# Patient Record
Sex: MALE | Race: WHITE | Employment: UNEMPLOYED | URBAN - METROPOLITAN AREA
[De-identification: names, ages, dates, MRNs, and addresses within clinical notes are randomized per-mention and may not be internally consistent; named-entity substitution may affect disease eponyms.]

---

## 2019-12-06 ENCOUNTER — HOSPITAL ENCOUNTER (INPATIENT)
Facility: CLINIC | Age: 24
LOS: 4 days | Discharge: HOME | End: 2019-12-11
Attending: PSYCHIATRY & NEUROLOGY | Admitting: PSYCHIATRY & NEUROLOGY
Payer: MEDICAID

## 2019-12-06 DIAGNOSIS — R45.851 SUICIDAL IDEATION: ICD-10-CM

## 2019-12-06 DIAGNOSIS — F33.2 SEVERE RECURRENT MAJOR DEPRESSION WITHOUT PSYCHOTIC FEATURES (H): ICD-10-CM

## 2019-12-06 DIAGNOSIS — F41.9 ANXIETY: Primary | ICD-10-CM

## 2019-12-06 DIAGNOSIS — F39 MOOD DISORDER (H): ICD-10-CM

## 2019-12-06 LAB
AMPHETAMINES UR QL SCN: NEGATIVE
BARBITURATES UR QL: NEGATIVE
BENZODIAZ UR QL: NEGATIVE
CANNABINOIDS UR QL SCN: NEGATIVE
COCAINE UR QL: NEGATIVE
ETHANOL UR QL SCN: NEGATIVE
OPIATES UR QL SCN: NEGATIVE

## 2019-12-06 PROCEDURE — 80320 DRUG SCREEN QUANTALCOHOLS: CPT | Performed by: PSYCHIATRY & NEUROLOGY

## 2019-12-06 PROCEDURE — 90791 PSYCH DIAGNOSTIC EVALUATION: CPT

## 2019-12-06 PROCEDURE — 99285 EMERGENCY DEPT VISIT HI MDM: CPT | Mod: 25 | Performed by: PSYCHIATRY & NEUROLOGY

## 2019-12-06 PROCEDURE — 99284 EMERGENCY DEPT VISIT MOD MDM: CPT | Mod: Z6 | Performed by: PSYCHIATRY & NEUROLOGY

## 2019-12-06 PROCEDURE — 80307 DRUG TEST PRSMV CHEM ANLYZR: CPT | Performed by: PSYCHIATRY & NEUROLOGY

## 2019-12-06 ASSESSMENT — ENCOUNTER SYMPTOMS
NERVOUS/ANXIOUS: 0
ABDOMINAL PAIN: 0
DYSPHORIC MOOD: 1
HALLUCINATIONS: 0
SHORTNESS OF BREATH: 0
FEVER: 0

## 2019-12-06 NOTE — ED TRIAGE NOTES
Hx of PTSD and depression.  Staying with Brother in [place] (originally from [place]).  Cut self on right and left forearms with different stages of healing.  Called EMS due to feeling unsafe.  Does not have regular treatment or doc in [place], but has Hx of medications in [place]/ not currently on any meds.  Family in [place]

## 2019-12-06 NOTE — ED NOTES
Bed: ED16A  Expected date: 12/6/19  Expected time: 5:05 PM  Means of arrival:   Comments:  Dudley [id number]   23 y/o male SI

## 2019-12-07 PROBLEM — R45.851 SUICIDAL IDEATION: Status: ACTIVE | Noted: 2019-12-07

## 2019-12-07 PROCEDURE — 36415 COLL VENOUS BLD VENIPUNCTURE: CPT | Performed by: NURSE PRACTITIONER

## 2019-12-07 PROCEDURE — 12400001 ZZH R&B MH UMMC

## 2019-12-07 PROCEDURE — 82306 VITAMIN D 25 HYDROXY: CPT | Performed by: NURSE PRACTITIONER

## 2019-12-07 PROCEDURE — 99223 1ST HOSP IP/OBS HIGH 75: CPT | Mod: AI | Performed by: NURSE PRACTITIONER

## 2019-12-07 PROCEDURE — 25000132 ZZH RX MED GY IP 250 OP 250 PS 637: Performed by: NURSE PRACTITIONER

## 2019-12-07 RX ORDER — ALUMINA, MAGNESIA, AND SIMETHICONE 2400; 2400; 240 MG/30ML; MG/30ML; MG/30ML
30 SUSPENSION ORAL EVERY 4 HOURS PRN
Status: DISCONTINUED | OUTPATIENT
Start: 2019-12-07 | End: 2019-12-11 | Stop reason: HOSPADM

## 2019-12-07 RX ORDER — TRAZODONE HYDROCHLORIDE 50 MG/1
50 TABLET, FILM COATED ORAL
Status: DISCONTINUED | OUTPATIENT
Start: 2019-12-07 | End: 2019-12-11 | Stop reason: HOSPADM

## 2019-12-07 RX ORDER — OLANZAPINE 10 MG/1
10 TABLET ORAL
Status: DISCONTINUED | OUTPATIENT
Start: 2019-12-07 | End: 2019-12-07

## 2019-12-07 RX ORDER — SERTRALINE HYDROCHLORIDE 25 MG/1
25 TABLET, FILM COATED ORAL DAILY
Status: COMPLETED | OUTPATIENT
Start: 2019-12-07 | End: 2019-12-08

## 2019-12-07 RX ORDER — HYDROXYZINE HYDROCHLORIDE 25 MG/1
25 TABLET, FILM COATED ORAL EVERY 4 HOURS PRN
Status: DISCONTINUED | OUTPATIENT
Start: 2019-12-07 | End: 2019-12-07

## 2019-12-07 RX ORDER — BISACODYL 10 MG
10 SUPPOSITORY, RECTAL RECTAL DAILY PRN
Status: DISCONTINUED | OUTPATIENT
Start: 2019-12-07 | End: 2019-12-11 | Stop reason: HOSPADM

## 2019-12-07 RX ORDER — OLANZAPINE 10 MG/2ML
10 INJECTION, POWDER, FOR SOLUTION INTRAMUSCULAR
Status: DISCONTINUED | OUTPATIENT
Start: 2019-12-07 | End: 2019-12-07

## 2019-12-07 RX ORDER — ACETAMINOPHEN 325 MG/1
650 TABLET ORAL EVERY 4 HOURS PRN
Status: DISCONTINUED | OUTPATIENT
Start: 2019-12-07 | End: 2019-12-11 | Stop reason: HOSPADM

## 2019-12-07 RX ORDER — HYDROXYZINE HYDROCHLORIDE 25 MG/1
25-50 TABLET, FILM COATED ORAL EVERY 4 HOURS PRN
Status: DISCONTINUED | OUTPATIENT
Start: 2019-12-07 | End: 2019-12-11 | Stop reason: HOSPADM

## 2019-12-07 RX ADMIN — SERTRALINE HYDROCHLORIDE 25 MG: 25 TABLET ORAL at 11:18

## 2019-12-07 ASSESSMENT — ACTIVITIES OF DAILY LIVING (ADL)
HYGIENE/GROOMING: INDEPENDENT
RETIRED_EATING: 0-->INDEPENDENT
HYGIENE/GROOMING: INDEPENDENT
DRESS: INDEPENDENT
FALL_HISTORY_WITHIN_LAST_SIX_MONTHS: NO
TRANSFERRING: 0-->INDEPENDENT
TOILETING: 0-->INDEPENDENT
SWALLOWING: 0-->SWALLOWS FOODS/LIQUIDS WITHOUT DIFFICULTY
RETIRED_COMMUNICATION: 0-->UNDERSTANDS/COMMUNICATES WITHOUT DIFFICULTY
ORAL_HYGIENE: INDEPENDENT
BATHING: 0-->INDEPENDENT
COGNITION: 0 - NO COGNITION ISSUES REPORTED
DRESS: 0-->INDEPENDENT
AMBULATION: 0-->INDEPENDENT

## 2019-12-07 ASSESSMENT — MIFFLIN-ST. JEOR: SCORE: 1500.67

## 2019-12-07 NOTE — ED PROVIDER NOTES
History     Chief Complaint   Patient presents with     Suicidal     plan to cut wrists     The history is provided by the patient and medical records.     Augustin Estrella is a 24 year old male who comes in due to his worsening depression.  He moved to [place] to live with his brother in August 2019.  He has been unable to hold down a job longer than 2 weeks. His brother does not understand his mental health and says things that are hard for Augustin to hear. He has been thinking of hanging himself now for several weeks. He had a noose up in the garage with a stool under it but did not do it.  Today he thought he was going to try again but called the suicidal hot line instead.  He has not been on medications for the last 6 months. He states he has tried many meds in the past.  He has multiple cuts on his arm.  None have broken the skin but there are multiple ones.  There are some old scars from previous cutting.    Please see the 's assessment in [facility] from today (12/6/19) for further details.    I have reviewed the Medications, Allergies, Past Medical and Surgical History, and Social History in the Epic system.    Review of Systems   Constitutional: Negative for fever.   Respiratory: Negative for shortness of breath.    Cardiovascular: Negative for chest pain.   Gastrointestinal: Negative for abdominal pain.   Psychiatric/Behavioral: Positive for dysphoric mood, self-injury and suicidal ideas. Negative for hallucinations. The patient is not nervous/anxious.    All other systems reviewed and are negative.      Physical Exam   BP: (!) 141/75  Pulse: 91  Temp: 98.8  F (37.1  C)  Resp: (!) 2  SpO2: 97 %      Physical Exam  Vitals signs and nursing note reviewed.   Constitutional:       Appearance: Normal appearance. He is well-developed.   Cardiovascular:      Rate and Rhythm: Normal rate and regular rhythm.      Heart sounds: Normal heart sounds.   Pulmonary:      Effort: Pulmonary effort is normal. No respiratory  distress.      Breath sounds: Normal breath sounds.   Neurological:      Mental Status: He is alert and oriented to person, place, and time.   Psychiatric:         Attention and Perception: Attention and perception normal.         Mood and Affect: Affect normal. Mood is depressed.         Speech: Speech normal.         Behavior: Behavior is slowed. Behavior is cooperative.         Thought Content: Thought content is not paranoid or delusional. Thought content includes suicidal ideation. Thought content does not include homicidal ideation. Thought content includes suicidal plan. Thought content does not include homicidal plan.         Cognition and Memory: Cognition and memory normal.         Judgment: Judgment normal.      Comments: Augustin is a 25 y/o male who looks his age. He is well groomed with good eye contact.          ED Course     ED Course as of Dec 06 1855   Fri Dec 06, 2019   1741 I have performed an in person assessment of the patient. Based on this assessment the patient no longer requires a one on one attendant at this point in time.    Ok Llanos DO  5:42 PM  December 6, 2019             Procedures               Labs Ordered and Resulted from Time of ED Arrival Up to the Time of Departure from the ED   DRUG ABUSE SCREEN 6 CHEM DEP URINE ([facility])            Assessments & Plan (with Medical Decision Making)   Augustin will be admitted to the hospital due to his worsening depression and suicidal thoughts.  There are clear cluster B traits and some help with working on his emotional dysregulation and hopelessness will be helpful. He will go to station 4a under Dr. Tidwell.    I have reviewed the nursing notes.    I have reviewed the findings, diagnosis, plan and need for follow up with the patient.    New Prescriptions    No medications on file       Final diagnoses:   Mood disorder (H)       12/6/2019   [facility], [place], EMERGENCY DEPARTMENT     Toño Lopez MD  12/06/19 2010

## 2019-12-07 NOTE — PROGRESS NOTES
12/07/19 0120   Patient Belongings   Did you bring any home meds/supplements to the hospital?  No   Patient Belongings locker   Patient Belongings Put in Hospital Secure Location (Security or Locker, etc.) cash/credit card;cell phone/electronics;clothing;wallet;shoes;plastic bag;money (see comment)   Belongings Search Yes   Clothing Search Yes   Second Staff Jovany .O, RN     Jose Vercarrie LG phone  Black Jeans  Black T- Shirt  Pair of black socks  Pair of black gloves  Black winter jacket  Grey and Red sweat shirt with strings  Black Sneakers    Black wallet with [place] ID    ITEMS SENT TO [facility] #[id number]    Cash $94.00  [employer] visa Debit Card---[id number]  Social Security Card---[id number]      A               Admission:  I am responsible for any personal items that are not sent to the safe or pharmacy.  [facility] is not responsible for loss, theft or damage of any property in my possession.    Signature:  _________________________________ Date: _______  Time: _____                                              Staff Signature:  ____________________________ Date: ________  Time: _____      2nd Staff person, if patient is unable/unwilling to sign:    Signature: ________________________________ Date: ________  Time: _____     Discharge:  [facility] has returned all of my personal belongings:    Signature: _________________________________ Date: ________  Time: _____                                          Staff Signature:  ____________________________ Date: ________  Time: _____

## 2019-12-07 NOTE — PROGRESS NOTES
"Augustin Estrella is a 24 year old male who comes in to the unit from the ED. According to the ED report,he came in due to his worsening depression.  He moved to [place] to live with his brother in August 2019.  He has been unable to hold down a job longer than 2 weeks. His brother does not understand his mental health and says things that are hard for Augustin to hear. He has been thinking of hanging himself now for several weeks. He had a noose up in the garage with a stool under it but did not do it.  Today he thought he was going to try again but called the suicidal hot line instead.  He has not been on medications for the last 6 months. He states he has tried many meds in the past.  He has multiple cuts on his arm.  None have broken the skin but there are multiple ones.  There are some old scars from previous cutting.On assessment at the unit, He denies any suicidal thoughts at the moment,patient states \"I feel safe here than at home.\" This staff explained to patient to report to staff if he had any thoughts to harm self or others while on the unit. Patient verbalizes understanding.  "

## 2019-12-07 NOTE — ED NOTES
ED to Behavioral Floor Handoff    SITUATION  Augustin Estrella is a 24 year old male who speaks English and lives in a home with others The patient arrived in the ED by ambulance from home with a complaint of Suicidal (plan to cut wrists)  .The patient's current symptoms started/worsened 2 month(s) ago and during this time the symptoms have increased.   In the ED, pt was diagnosed with   Final diagnoses:   Mood disorder (H)        Initial vitals were: BP: (!) 141/75  Pulse: 91  Temp: 98.8  F (37.1  C)  Resp: (!) 2  SpO2: 97 %   --------  Is the patient diabetic? No   If yes, last blood glucose? --     If yes, was this treated in the ED? --  --------  Is the patient inebriated (ETOH) No or Impaired on other substances? No  MSSA done? N/A  Last MSSA score: --    Were withdrawal symptoms treated? N/A  Does the patient have a seizure history? No. If yes, date of most recent seizure--  --------  Is the patient patient experiencing suicidal ideation? Suicidal with thoughts of hanging self. Homicidal ideation? denies current or recent homicidal ideation or behaviors.    Self-injurious behavior/urges? reports current or recent self injurious behavior or ideation including superficial cutting of arms.  ------  Was pt aggressive in the ED No  Was a code called No  Is the pt now cooperative? Yes  -------  Meds given in ED: Medications - No data to display   Family present during ED course? No  Family currently present? No    BACKGROUND  Does the patient have a cognitive impairment or developmental disability? No  Allergies: No Known Allergies.   Social demographics are   Social History     Socioeconomic History     Marital status: Single     Spouse name: None     Number of children: None     Years of education: None     Highest education level: None   Occupational History     None   Social Needs     Financial resource strain: None     Food insecurity:     Worry: None     Inability: None     Transportation needs:     Medical: None      Non-medical: None   Tobacco Use     Smoking status: Never Smoker     Smokeless tobacco: Never Used   Substance and Sexual Activity     Alcohol use: None     Drug use: None     Sexual activity: None   Lifestyle     Physical activity:     Days per week: None     Minutes per session: None     Stress: None   Relationships     Social connections:     Talks on phone: None     Gets together: None     Attends Mandaeism service: None     Active member of club or organization: None     Attends meetings of clubs or organizations: None     Relationship status: None     Intimate partner violence:     Fear of current or ex partner: None     Emotionally abused: None     Physically abused: None     Forced sexual activity: None   Other Topics Concern     None   Social History Narrative     None        ASSESSMENT  Labs results   Labs Ordered and Resulted from Time of ED Arrival Up to the Time of Departure from the ED   DRUG ABUSE SCREEN 6 CHEM DEP URINE ([facility])      Imaging Studies: No results found for this or any previous visit (from the past 24 hour(s)).   Most recent vital signs BP (!) 141/75   Pulse 91   Temp 98.8  F (37.1  C) (Oral)   Resp (!) 2   SpO2 97%    Abnormal labs/tests/findings requiring intervention:---   Pain control: pt had none  Nausea control: pt had none    RECOMMENDATION  Are any infection precautions needed (MRSA, VRE, etc.)? No If yes, what infection? --  ---  Does the patient have mobility issues? independently. If yes, what device does the pt use? ---  ---  Is patient on 72 hour hold or commitment? No If on 72 hour hold, have hold and rights been given to patient? N/A  Are admitting orders written if after 10 p.m. ?N/A  Tasks needing to be completed:---     SUSANNA DANIELSON RN   [facility]--    [phone] [facility]
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
## 2019-12-07 NOTE — PHARMACY-ADMISSION MEDICATION HISTORY
Admission Medication History status for the 12/6/2019 admission is complete.  See EPIC admission navigator for Prior to Admission medications.    Medication history interview sources:  Patient     Medication history source reliability: Good    Patient reports taking no prescription or over-the-counter medications    Medication history completed by: Margarito Onofre, Pharmacy Intern\    Prior to Admission medications    Not on File

## 2019-12-07 NOTE — PROGRESS NOTES
Patient evaluation continues. Assessed mood, anxiety, thoughts and behavior    Patient up and visible in the milieu.  Oriented to the unit, expectations.    Mid-range affect.  Pleasant and cooperative.    Accepted ordered Zoloft.    Denies active SI/SIB--although stated that he does have fleeting thoughts only occasionally.  Feels safe on the unit.    Rates depression as 7/10 and anxiety 6/10.  Denies hallucinations--thinking is linear and organized.  Somewhat quiet to self.  Denies concerns regarding sleep, appetite, medication side effects.
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
## 2019-12-08 LAB
ALBUMIN SERPL-MCNC: 3.9 G/DL (ref 3.4–5)
ALP SERPL-CCNC: 40 U/L (ref 40–150)
ALT SERPL W P-5'-P-CCNC: 24 U/L (ref 0–70)
ANION GAP SERPL CALCULATED.3IONS-SCNC: 4 MMOL/L (ref 3–14)
AST SERPL W P-5'-P-CCNC: 10 U/L (ref 0–45)
BASOPHILS # BLD AUTO: 0 10E9/L (ref 0–0.2)
BASOPHILS NFR BLD AUTO: 0.5 %
BILIRUB SERPL-MCNC: 0.7 MG/DL (ref 0.2–1.3)
BUN SERPL-MCNC: 9 MG/DL (ref 7–30)
CALCIUM SERPL-MCNC: 8.8 MG/DL (ref 8.5–10.1)
CHLORIDE SERPL-SCNC: 105 MMOL/L (ref 94–109)
CHOLEST SERPL-MCNC: 114 MG/DL
CO2 SERPL-SCNC: 29 MMOL/L (ref 20–32)
CREAT SERPL-MCNC: 0.84 MG/DL (ref 0.66–1.25)
DIFFERENTIAL METHOD BLD: NORMAL
EOSINOPHIL # BLD AUTO: 0.2 10E9/L (ref 0–0.7)
EOSINOPHIL NFR BLD AUTO: 2.4 %
ERYTHROCYTE [DISTWIDTH] IN BLOOD BY AUTOMATED COUNT: 12.7 % (ref 10–15)
GFR SERPL CREATININE-BSD FRML MDRD: >90 ML/MIN/{1.73_M2}
GLUCOSE SERPL-MCNC: 88 MG/DL (ref 70–99)
HCT VFR BLD AUTO: 45.5 % (ref 40–53)
HDLC SERPL-MCNC: 62 MG/DL
HGB BLD-MCNC: 14.5 G/DL (ref 13.3–17.7)
IMM GRANULOCYTES # BLD: 0 10E9/L (ref 0–0.4)
IMM GRANULOCYTES NFR BLD: 0.4 %
LDLC SERPL CALC-MCNC: 43 MG/DL
LYMPHOCYTES # BLD AUTO: 3.5 10E9/L (ref 0.8–5.3)
LYMPHOCYTES NFR BLD AUTO: 42.5 %
MCH RBC QN AUTO: 29.6 PG (ref 26.5–33)
MCHC RBC AUTO-ENTMCNC: 31.9 G/DL (ref 31.5–36.5)
MCV RBC AUTO: 93 FL (ref 78–100)
MONOCYTES # BLD AUTO: 0.7 10E9/L (ref 0–1.3)
MONOCYTES NFR BLD AUTO: 8.4 %
NEUTROPHILS # BLD AUTO: 3.7 10E9/L (ref 1.6–8.3)
NEUTROPHILS NFR BLD AUTO: 45.8 %
NONHDLC SERPL-MCNC: 52 MG/DL
NRBC # BLD AUTO: 0 10*3/UL
NRBC BLD AUTO-RTO: 0 /100
PLATELET # BLD AUTO: 266 10E9/L (ref 150–450)
POTASSIUM SERPL-SCNC: 4.2 MMOL/L (ref 3.4–5.3)
PROT SERPL-MCNC: 7.4 G/DL (ref 6.8–8.8)
RBC # BLD AUTO: 4.9 10E12/L (ref 4.4–5.9)
SODIUM SERPL-SCNC: 138 MMOL/L (ref 133–144)
TRIGL SERPL-MCNC: 47 MG/DL
TSH SERPL DL<=0.005 MIU/L-ACNC: 3.03 MU/L (ref 0.4–4)
WBC # BLD AUTO: 8.2 10E9/L (ref 4–11)

## 2019-12-08 PROCEDURE — 36415 COLL VENOUS BLD VENIPUNCTURE: CPT | Performed by: PSYCHIATRY & NEUROLOGY

## 2019-12-08 PROCEDURE — 80061 LIPID PANEL: CPT | Performed by: PSYCHIATRY & NEUROLOGY

## 2019-12-08 PROCEDURE — 80053 COMPREHEN METABOLIC PANEL: CPT | Performed by: PSYCHIATRY & NEUROLOGY

## 2019-12-08 PROCEDURE — 85025 COMPLETE CBC W/AUTO DIFF WBC: CPT | Performed by: PSYCHIATRY & NEUROLOGY

## 2019-12-08 PROCEDURE — 12400001 ZZH R&B MH UMMC

## 2019-12-08 PROCEDURE — 25000132 ZZH RX MED GY IP 250 OP 250 PS 637: Performed by: NURSE PRACTITIONER

## 2019-12-08 PROCEDURE — 84443 ASSAY THYROID STIM HORMONE: CPT | Performed by: PSYCHIATRY & NEUROLOGY

## 2019-12-08 RX ADMIN — SERTRALINE HYDROCHLORIDE 25 MG: 25 TABLET ORAL at 09:10

## 2019-12-08 ASSESSMENT — ACTIVITIES OF DAILY LIVING (ADL)
HYGIENE/GROOMING: INDEPENDENT
ORAL_HYGIENE: INDEPENDENT
LAUNDRY: WITH SUPERVISION
HYGIENE/GROOMING: INDEPENDENT
DRESS: INDEPENDENT
LAUNDRY: WITH SUPERVISION
DRESS: INDEPENDENT
ORAL_HYGIENE: INDEPENDENT

## 2019-12-08 NOTE — PROGRESS NOTES
"Patient states he feels anxious but denies SI/SIB/HI/AVH. Did engage in focus group and provided constructive input. States medications have not produced much of an effect yet but understands that it may take a few more days. Is very anxious at the idea of going back to his brother's home. Feels like hospitalization is \"a vicious cycle right now & I don't know if I can escape it.\"       12/08/19 1313   Behavioral Health   Hallucinations denies / not responding to hallucinations   Thinking intact   Orientation person: oriented;place: oriented;date: oriented;time: oriented   Memory baseline memory   Insight admits / accepts   Judgement intact   Eye Contact at examiner;out of corner of eyes   Affect blunted, flat   Mood anxious   Physical Appearance/Attire attire appropriate to age and situation   Hygiene well groomed   Suicidality other (see comments)  (denies)   1. Wish to be Dead (Recent) No   2. Non-Specific Active Suicidal Thoughts (Recent) No   Self Injury   (no concerns currently, no thoughts)   Elopement   (no concerns, here voluntarily)   Activity other (see comment)  (attended groups)   Speech clear;coherent   Medication Sensitivity no stated side effects;no observed side effects   Psychomotor / Gait balanced;steady   Activities of Daily Living   Hygiene/Grooming independent   Oral Hygiene independent   Dress independent   Laundry with supervision   Room Organization independent   Varinder Gonsalez on 12/8/2019 at 1:17 PM    "

## 2019-12-08 NOTE — PROGRESS NOTES
Patient participated in some group activities. He reported that he feels ok, but not happy. He denies suicidal ideations, hallucinations, self-injurious thoughts, rates depression and anxiety as seven out of ten respectively. Pt stated that he has wish to be dead, but no active or non-active plans. Pt appears calm, pleasant, socially withdrawn, displays flat affect, and accepts redirections.     12/07/19 1806   Behavioral Health   Hallucinations denies / not responding to hallucinations   Thinking distractable   Orientation person: oriented;place: oriented   Memory baseline memory   Insight admits / accepts   Judgement impaired   Eye Contact at examiner   Affect blunted, flat   Mood mood is calm   Physical Appearance/Attire appears stated age;attire appropriate to age and situation   Hygiene well groomed   Suicidality other (see comments)  (Pt denies)   1. Wish to be Dead (Recent) Yes   2. Non-Specific Active Suicidal Thoughts (Recent) No   Self Injury other (see comment)  (Pt denies)   Elopement   (No concern)   Activity withdrawn   Speech clear;coherent   Medication Sensitivity no stated side effects;no observed side effects   Psychomotor / Gait balanced;steady   Coping/Psychosocial   Verbalized Emotional State anxiety;depression   Safety   Suicidality Status 15   Assault status 15   Elopement status 15   Activities of Daily Living   Hygiene/Grooming independent   Oral Hygiene independent   Dress independent   Room Organization independent   Activity   Activity Assistance Provided independent

## 2019-12-09 LAB — DEPRECATED CALCIDIOL+CALCIFEROL SERPL-MC: 18 UG/L (ref 20–75)

## 2019-12-09 PROCEDURE — 99232 SBSQ HOSP IP/OBS MODERATE 35: CPT | Performed by: CLINICAL NURSE SPECIALIST

## 2019-12-09 PROCEDURE — 25000132 ZZH RX MED GY IP 250 OP 250 PS 637: Performed by: NURSE PRACTITIONER

## 2019-12-09 PROCEDURE — G0177 OPPS/PHP; TRAIN & EDUC SERV: HCPCS

## 2019-12-09 PROCEDURE — 25000132 ZZH RX MED GY IP 250 OP 250 PS 637: Performed by: PSYCHIATRY & NEUROLOGY

## 2019-12-09 PROCEDURE — 12400001 ZZH R&B MH UMMC

## 2019-12-09 RX ADMIN — SERTRALINE HYDROCHLORIDE 50 MG: 50 TABLET ORAL at 09:25

## 2019-12-09 RX ADMIN — TRAZODONE HYDROCHLORIDE 50 MG: 50 TABLET ORAL at 22:22

## 2019-12-09 ASSESSMENT — ACTIVITIES OF DAILY LIVING (ADL)
HYGIENE/GROOMING: INDEPENDENT
DRESS: INDEPENDENT
ORAL_HYGIENE: INDEPENDENT
HYGIENE/GROOMING: INDEPENDENT

## 2019-12-09 NOTE — PLAN OF CARE
Pt has been out in the milieu much of the shift watching movies. Seems to keep to himself most of the time. Affect appears subdued. Addendum; Pt attended and participated in  evening group. States continues to feel anxious and depressed. He is anxious about going back to live with his brother and doesn't know what the plan is for him.  Pt instructed to let me know if he would like any medication for anxiety. Pt did not want it at the time. He ate part of his supper , not eating the entree which he did not like. Pt has fairly good eye contact and is calm acting and cooperative.

## 2019-12-09 NOTE — PLAN OF CARE
"OT Self-Assessment  Pt was given and completed a written self-assessment form. OT staff reviewed with pt and explained the value of having them involved in their treatment plan, and provided options to meet current needs/self-identified goals.     Pt identified \"family/home issues, adulthood\" as stressors/events that led to hospitalization.    Pt identified the following symptoms that they are currently dealing with:   Emotions: sadness, anxiety or fear, resentment, feeling numb/helpless/depressed, guilt, despair, feeling overwhelmed  Thoughts: negative thoughts, racing thoughts, memory problems, trouble concentrating, trouble making decisions, nightmares, slowed thinking, blanking out, self-harm or suicidal thoughts, obsessive thoughts  Behaviors: suicidal gesture, self-harming actions, withdrawal, or spending too much time alone, restless behaviors, problems starting or completing projects, budget/money concerns, victim of abuse/crime, procrastinating    Self-identified coping skills: \"talking to friends, playing video games, writing\"  Self-identified social supports: \"mom, friends even though they're all far\"  Self-identified personal strengths: \"understanding\"    Goals: Manage anxiety, improve or resume functioning    "

## 2019-12-09 NOTE — PLAN OF CARE
Patient evaluation continues. Assessed mood, anxiety, thoughts and behavior     Patient up visible in the milieu. Full range affect.  Attending group.  Continues to report fleeting SI, but contracts for safety on the unit.  Rates depression as 7, and anxiety as 3.  Thinking is linear and organized.  States he is concern about returning to live with his brother when discharged and would like to return to [place] at some point.  (He tried to live with his brother a year ago and he ended up returning to [place] at that time.)  Offers no concerns regarding sleep, appetite, concentration, medication side effects.  To see representative from business office today.  Offered support for mindfulness and he was receptive.  Pleasant and cooperative.

## 2019-12-09 NOTE — PLAN OF CARE
INITIAL OT NOTE  Problem: OT General Care Plan  Goal: OT Goal 1  Description  Pt will practice using >2 coping strategies to manage stress and reduce symptoms to demonstrate increased readiness for discharge.     Pt attended 3 out of 3 OT groups offered. Pt actively participated in occupational therapy clinic. Pt was able to ask for assistance as needed, and independently initiate self-selected task. Pt demonstrated good focus, planning, and problem solving. Pt appeared comfortable interacting with peers. Pt actively participated in structured occupational therapy group designed to promote creative expression, reality orientation, self-reflection and discussion. Pt completed 100% of the task independently. Pt demonstrated creativity and attention to detail. Pt very engaged in all groups today.

## 2019-12-09 NOTE — PROGRESS NOTES
Writer called financial to inform them the pt does not have insurance. Writer was informed that pt should be in their work que.

## 2019-12-09 NOTE — PROGRESS NOTES
12/08/19 [facility]   Therapeutic Recreation   Type of Intervention structured groups   Activity game   Response Participates, initiates socially appropriate   Hours 1     Pt participated in Therapeutic Recreation group with focus on stress reduction, creative expression, and leisure participation. Engaged and cooperative in a  recreational activity via a group drawing game. Pt participated throughout entire duration of the group. Pt added to group discussion, sociable, and was appropriate with interactions. Showed progress in session goals. Pt mood was calm. Appropriately shared sense of humor with peers during group and appeared to brighten with social interaction.

## 2019-12-09 NOTE — PLAN OF CARE
BEHAVIORAL TEAM DISCUSSION    Participants: 4A Provider: Debra Naegele, APRN, CNS; 4A RN's: Kyleigh Barbosa, RN; 4A CTC's: Gali Chapman (CTC).  Progress: No Change.  Continued Stay Criteria/Rationale: suicidal ideation   Medical/Physical: None  Precautions:    Behavioral Orders   Procedures    Code 1 - Restrict to Unit    Routine Programming     As clinically indicated    Self Injury Precaution    Status 15     Every 15 minutes.    Suicide precautions     Patients on Suicide Precautions should have a Combination Diet ordered that includes a Diet selection(s) AND a Behavioral Tray selection for Safe Tray - with utensils, or Safe Tray - NO utensils       Plan:The following services will be provided to the patient; psychiatric assessment, medication management, therapeutic milieu, individual and group support, art therapy, and skills/OT groups.   Rationale for change in precautions or plan: N/A

## 2019-12-09 NOTE — PROGRESS NOTES
"[facility], [place]   Psychiatric Progress Note        Interim History:   The patient's care was discussed with the treatment team during the daily team meeting and/or staff's chart notes were reviewed.  Staff report patient is visible in the milieu and attends groups.     Psychiatric symptoms and interventions:   Patient reports feeling depressed and anxious. He reports his relationship with his brother has not been going well. He does not have insurance and worries about getting a job. He is thinking of returning to [place]  but does not think he will be able to afford going back any time soon.     Patient denies any side effects from sertraline. Patient reports he does not have suicidal ideation if he does not think about his life situation.     Medical: Admission labs unremarkable, UTOX was negative.     Behavioral/psychology/social:   Encouraged patient to attend therapeutic hospital programming as tolerated.          Medications:       sertraline  50 mg Oral Daily          Allergies:   No Known Allergies       Labs:   No results found for this or any previous visit (from the past 24 hour(s)).       Psychiatric Examination:     /65   Pulse 91   Temp 97.9  F (36.6  C) (Oral)   Resp 16   Ht 1.676 m (5' 6\")   Wt 55.8 kg (123 lb)   SpO2 99%   BMI 19.85 kg/m    Weight is 123 lbs 0 oz  Body mass index is 19.85 kg/m .  Orthostatic Vitals     None            Appearance: awake, alert and adequately groomed  Attitude:  cooperative  Eye Contact:  good  Mood:  anxious and depressed  Affect:  appropriate and in normal range  Speech:  clear, coherent  Psychomotor Behavior:  no evidence of tardive dyskinesia, dystonia, or tics  Throught Process:  logical, linear and goal oriented  Associations:  no loose associations  Thought Content:  passive suicidal ideation present  Insight:  fair  Judgement:  fair  Oriented to:  time, person, and place  Attention Span and Concentration:  " intact  Recent and Remote Memory:  intact    Clinical Global Impressions  First:  Considering your total clinical experience with this particular patient population, how severe are the patient's symptoms at this time?: 7 (12/07/19 0959)  Compared to the patient's condition at the START of treatment, this patient's condition is:: 4 (12/07/19 0959)  Most recent:  Considering your total clinical experience with this particular patient population, how severe are the patient's symptoms at this time?: 7 (12/07/19 0959)  Compared to the patient's condition at the START of treatment, this patient's condition is:: 4 (12/07/19 0959)         Precautions:     Behavioral Orders   Procedures     Code 1 - Restrict to Unit     Routine Programming     As clinically indicated     Self Injury Precaution     Status 15     Every 15 minutes.     Suicide precautions     Patients on Suicide Precautions should have a Combination Diet ordered that includes a Diet selection(s) AND a Behavioral Tray selection for Safe Tray - with utensils, or Safe Tray - NO utensils            DIagnoses:   Major depressive disorder, severe, recurrent, without psychosis  PTSD  Social anxiety disorder         Plan:     Legal status: Voluntary     Medication management: Zoloft titration, currently at 50 mg.     Disposition plan: stabilize with medications, therapy, return to live with brother.

## 2019-12-10 PROCEDURE — 25000132 ZZH RX MED GY IP 250 OP 250 PS 637: Performed by: NURSE PRACTITIONER

## 2019-12-10 PROCEDURE — H2032 ACTIVITY THERAPY, PER 15 MIN: HCPCS

## 2019-12-10 PROCEDURE — G0177 OPPS/PHP; TRAIN & EDUC SERV: HCPCS

## 2019-12-10 PROCEDURE — 12400001 ZZH R&B MH UMMC

## 2019-12-10 PROCEDURE — 99232 SBSQ HOSP IP/OBS MODERATE 35: CPT | Performed by: CLINICAL NURSE SPECIALIST

## 2019-12-10 RX ORDER — HYDROXYZINE HYDROCHLORIDE 25 MG/1
25-50 TABLET, FILM COATED ORAL EVERY 4 HOURS PRN
Qty: 120 TABLET | Refills: 1 | Status: SHIPPED | OUTPATIENT
Start: 2019-12-10

## 2019-12-10 RX ADMIN — SERTRALINE HYDROCHLORIDE 50 MG: 50 TABLET ORAL at 09:17

## 2019-12-10 RX ADMIN — HYDROXYZINE HYDROCHLORIDE 25 MG: 25 TABLET, FILM COATED ORAL at 09:18

## 2019-12-10 ASSESSMENT — ACTIVITIES OF DAILY LIVING (ADL)
DRESS: INDEPENDENT
ORAL_HYGIENE: INDEPENDENT
LAUNDRY: WITH SUPERVISION
DRESS: INDEPENDENT
LAUNDRY: WITH SUPERVISION
ORAL_HYGIENE: INDEPENDENT
HYGIENE/GROOMING: INDEPENDENT
HYGIENE/GROOMING: INDEPENDENT

## 2019-12-10 ASSESSMENT — MIFFLIN-ST. JEOR: SCORE: 1495.23

## 2019-12-10 NOTE — PROGRESS NOTES
Behavioral Health  Note    Behavioral Health  Spirituality Group Note    UNIT 4AW    Name: Augustin Estrella YOB: 1995   MRN: [id number] Age: 24 year old      Patient attended -led group, which included discussion of spirituality, coping with illness and building resilience.    Patient attended group for 1 hrs.    The patient actively participated in group discussion      Tk Brower  Chaplain Resident  Pager [phone]

## 2019-12-10 NOTE — DISCHARGE INSTRUCTIONS
Behavioral Discharge Planning and Instructions      Summary:  You were admitted on 12/6/2019  due to Suicidal Ideations.  You were treated by Debra Naegele, APRN, CNS and discharged on 12/11/19 from [facility] to Home      Principal Diagnosis:   Major depressive disorder, severe, recurrent, without psychosis  PTSD  Social anxiety disorder      Health Care Follow-up Appointments:   [facility]    Triage brings together Care coordinators, peer recovery specialists, and health professionals at [address] [place] [address] to address clients' physical health, mental health, addiction issues - and also the wrap-around services that they need to stay healthy, including housing, health insurance, and other resources.  The goal of this program is to reach people that are not already receiving services or that are not already connected to case management who have an urgent concern related to their mental health or substance use disorder.  People can access services on a walk basis in when Triage is open.  Hours:10:00 am - 5:00 pm  Phone Number: [phone]  Location: [address] [place] [address], room [address]141     Walk-In [facility]  [address] [place] [address]   Phone: [phone]  Hours:  Monday, Wednesday, Friday - 1pm-3pm  Monday-Thursday - 6:30pm-8:30pm    When you arrived at the hospital you did not have health insurance.  You have been given resources for [facility] to help you get in touch with services, including applying for medical assistance if you were not able to meet with someone while you were hospitalized.    Attend all scheduled appointments with your outpatient providers. Call at least 24 hours in advance if you need to reschedule an appointment to ensure continued access to your outpatient providers.   Major Treatments, Procedures and Findings:  You were provided with: a psychiatric assessment, assessed for medical stability, medication evaluation and/or management, group therapy, milieu management  and meeting with a patient     Symptoms to Report: feeling more aggressive, increased confusion, losing more sleep, mood getting worse or thoughts of suicide    Early warning signs can include: increased depression or anxiety sleep disturbances increased thoughts or behaviors of suicide or self-harm  increased unusual thinking, such as paranoia or hearing voices    Safety and Wellness:  The patient should take medications as prescribed.  Patient's caregivers are highly encouraged to supervise administering of medications and follow treatment recommendations.     Patient's caregivers should ensure patient does not have access to:    Firearms  Medicines (both prescribed and over-the-counter)  Knives and other sharp objects  Ropes and like materials  Alcohol  Car keys  If there is a concern for safety, call 911.    Resources:   Crisis Intervention: 270.208.6804 or 308-772-0017 (TTY: 143.411.9601).  Call anytime for help.  [employer] [place] on Mental Illness (www.[place].charissa.org): 150.230.6859 or 854-779-6687.  [facility] Crisis (COPE) Response - Adult [phone]      The treatment team has appreciated the opportunity to work with you.     Augustin,  please take care and make your recovery a daily recovery.   If you have any questions or concerns our unit number is [phone]

## 2019-12-10 NOTE — PROGRESS NOTES
"[facility], [place]   Psychiatric Progress Note        Interim History:   The patient's care was discussed with the treatment team during the daily team meeting and/or staff's chart notes were reviewed.  Staff report patient is visible in the milieu and attends groups.        Psychiatric symptoms and interventions:   Patient reports he is anxious about returning to live with his brother but is hopeful his mother will send him a plane ticket back home to [place].     Patient denies suicidal ideation. Patient has been interactive with his treatment. He has been cooperative with his medications. Patient has been safe on the unit.       Medical: Admission labs unremarkable, UTOX was negative.      Behavioral/psychology/social:   Encouraged patient to attend therapeutic hospital programming as tolerated.          Medications:       sertraline  50 mg Oral Daily          Allergies:   No Known Allergies       Labs:   No results found for this or any previous visit (from the past 24 hour(s)).       Psychiatric Examination:     /70   Pulse 66   Temp 97.5  F (36.4  C) (Tympanic)   Resp 16   Ht 1.676 m (5' 6\")   Wt 55.2 kg (121 lb 12.8 oz)   SpO2 98%   BMI 19.66 kg/m    Weight is 121 lbs 12.8 oz  Body mass index is 19.66 kg/m .  Orthostatic Vitals       Most Recent      Sitting Orthostatic /72 12/10 0900    Sitting Orthostatic Pulse (bpm) 73 12/10 0900    Standing Orthostatic /70 12/10 0900    Standing Orthostatic Pulse (bpm) 69 12/10 0900          Appearance: awake, alert and adequately groomed  Attitude:  cooperative  Eye Contact:  good  Mood:  anxious and depressed  Affect:  appropriate and in normal range  Speech:  clear, coherent  Psychomotor Behavior:  no evidence of tardive dyskinesia, dystonia, or tics  Throught Process:  logical, linear and goal oriented  Associations:  no loose associations  Thought Content:  denies passive suicidal ideation t  Insight:  " fair  Judgement:  fair  Oriented to:  time, person, and place  Attention Span and Concentration:  intact  Recent and Remote Memory:  intact     Clinical Global Impressions  First:  Considering your total clinical experience with this particular patient population, how severe are the patient's symptoms at this time?: 7 (12/07/19 0959)  Compared to the patient's condition at the START of treatment, this patient's condition is:: 4 (12/07/19 0959)  Most recent:  Considering your total clinical experience with this particular patient population, how severe are the patient's symptoms at this time?: 7 (12/07/19 0959)  Compared to the patient's condition at the START of treatment, this patient's condition is:: 4 (12/07/19 0959)         Precautions:     Behavioral Orders   Procedures     Code 1 - Restrict to Unit     Routine Programming     As clinically indicated     Self Injury Precaution     Status 15     Every 15 minutes.     Suicide precautions     Patients on Suicide Precautions should have a Combination Diet ordered that includes a Diet selection(s) AND a Behavioral Tray selection for Safe Tray - with utensils, or Safe Tray - NO utensils            DIagnoses:   Major depressive disorder, severe, recurrent, without psychosis  PTSD  Social anxiety disorder         Plan:     Legal status: Voluntary      Medication management: Zoloft titration, currently at 50 mg.      Disposition plan: stabilize with medications, therapy, return to live with brother.  Patient will discharge on Wednesday. Meds are ordered.

## 2019-12-10 NOTE — PROGRESS NOTES
"   12/09/19 1800   Behavioral Health   Hallucinations denies / not responding to hallucinations   Thinking intact   Orientation place: oriented;person: oriented;date: oriented;time: oriented   Memory baseline memory   Insight admits / accepts   Judgement   (fair)   Eye Contact at examiner   Affect full range affect   Mood mood is calm   Physical Appearance/Attire attire appropriate to age and situation   Hygiene well groomed   Suicidality other (see comments)  (denies today)   1. Wish to be Dead (Recent) No   2. Non-Specific Active Suicidal Thoughts (Recent) No   Self Injury other (see comment)  (denies SIB)   Elopement   (no concerns)   Activity   (no concerns)   Speech clear;coherent   Medication Sensitivity no stated side effects;no observed side effects   Psychomotor / Gait balanced;steady   Activities of Daily Living   Hygiene/Grooming independent   Oral Hygiene independent   Dress independent   Room Organization independent     Pt denied SI and SIB today but reported yesterday he had SI and SIB. Pt reported he is \"a little\" depressed but denies anxiety. Pt stated that he feels better than yesterday. Pt reported he is \"scared\" to go home to his brother's house. Pt would like to go back to his mother's house. Pt showed full range of affect and mood appears calm. Pt denied any concerns. Pt was observed reading and watching shows. Pt goal in community meeting was to read his book. Pt reported his sleep was good last night and his appetite has approved.  "

## 2019-12-10 NOTE — PROGRESS NOTES
"Pt mood was low, but he was receptive to peer and therapeutic supports.  He noted a strong connection to his mother who also has depressive symptoms, but she lives in [place] so \"isn't an immediate support.\"  Pt left to meet with provider and returned more weighted with concerns.  He shared his narrative in a coherent manner, and was able to express some physical openness with lateral movements.  He was able to express differences in mood with different physical postures.       12/10/19 0746   Dance Movement Therapy   Type of Intervention structured groups   Response participates with encouragement   Hours 1     "

## 2019-12-10 NOTE — PLAN OF CARE
"Problem: OT General Care Plan  Goal: OT Goal 1  Description  Pt will practice using >2 coping strategies to manage stress and reduce symptoms to demonstrate increased readiness for discharge.      Pt attended 2 out of 2 OT groups offered. Pt actively participated in occupational therapy clinic. Pt was able to ask for assistance as needed, and independently initiate a novel goal-oriented task. Pt demonstrated good focus, planning, and attention to detail. Pt appeared comfortable interacting with peers, often encouraging others. Pt actively participated in a structured occupational therapy group involving a self-reflecting, group leisure task. Pt appeared comfortable sharing positive past experiences and personal information with peers, and was respectful in listening and responding to peers. When prompted to identify a favorite vacation he took, pt states, \"going to [facility] in [place] with my family when we lived in [place]\". Pt is very engaged, calm, cooperative, and encouraging of peers during groups.        "

## 2019-12-10 NOTE — PROGRESS NOTES
INITIAL OT ASSESSMENT      12/09/19 1500   General Information   Date Initially Attended OT 12/09/19   Clinical Impression   Affect Appropriate to situation   Orientation Oriented to person, place and time   Appearance and ADLs General cleanliness observed in most areas   Attention to Internal Stimuli No observed signs   Interaction Skills Interacts appropriately with staff;Interacts appropriately with peers   Ability to Communicate Needs Independent   Verbal Content Clear;Articulate;Appropriate to topic   Ability to Maintain Boundaries Maintains appropriate physical boundaries;Maintains appropriate verbal boundaries   Participation Initiates participation   Concentration Concentrates 30+ minutes   Ability to Concentrate With structure   Follows and Comprehends Directions Independently follows multi-step directions   Memory Delayed and immediate recall intact   Organization Independently organizes all tasks   Decision Making Independent   Planning and Problem Solving Independently plans ahead   Ability to Apply and Learn Concepts Needs further assessment   Frustrations / Stress Tolerance Independently identifies sources of frustration/stress   Level of Insight Some insight   Self Esteem Takes risks with support and encouragement;Can identify positives   Social Supports Has knowledge of support systems

## 2019-12-10 NOTE — PLAN OF CARE
"RN Assessment: The patient's care was discussed with the treatment team during the daily team meeting. Recent labs reviewed and no significant concern. Vital signs this morning: BR=925/72, P=73, T=97.5.     Met with the patient this morning and he was calm and cooperative. Patient reported that his depression and anxiety have not improved. He reported depression of 9/10 and anxiety of 7/10 (0-10 scale, 10 being worst). Patient denied having SI/SIB and hallucinations. He recently started on Zoloft and has not experienced the effect yet. Patient denied experiencing side effects to Zoloft. He reported good sleep and appetite. He talked in length about his living situation. Patient currently lives with brother and his fiance and does not feel safe at home. He said that his brother does not understand what he is going through. \"He is not supportive and he treats me badly\". Patient said that living with his brother triggers his suicidal ideations. He reported wishing to have his own place or move out of his brother's house. Patient reported being close to his mother who currently lives in [place]. He is not close to his father and doesn't know where his father lives. Patient identified his coping skills as talking to friends and trying to sleep when depressed.     Interventions:    Patient given his morning Zoloft for depression  Patient given hydroxyzine 25 mg for anxiety  Education was provided about Zoloft and its mechanism of action.   "

## 2019-12-11 VITALS
HEIGHT: 66 IN | SYSTOLIC BLOOD PRESSURE: 137 MMHG | WEIGHT: 121.8 LBS | RESPIRATION RATE: 16 BRPM | TEMPERATURE: 98.5 F | DIASTOLIC BLOOD PRESSURE: 82 MMHG | HEART RATE: 74 BPM | OXYGEN SATURATION: 95 % | BODY MASS INDEX: 19.58 KG/M2

## 2019-12-11 PROCEDURE — 99239 HOSP IP/OBS DSCHRG MGMT >30: CPT | Performed by: CLINICAL NURSE SPECIALIST

## 2019-12-11 PROCEDURE — 25000132 ZZH RX MED GY IP 250 OP 250 PS 637: Performed by: NURSE PRACTITIONER

## 2019-12-11 PROCEDURE — G0177 OPPS/PHP; TRAIN & EDUC SERV: HCPCS

## 2019-12-11 RX ADMIN — SERTRALINE HYDROCHLORIDE 50 MG: 50 TABLET ORAL at 08:04

## 2019-12-11 ASSESSMENT — ACTIVITIES OF DAILY LIVING (ADL)
DRESS: INDEPENDENT
ORAL_HYGIENE: INDEPENDENT
HYGIENE/GROOMING: INDEPENDENT

## 2019-12-11 NOTE — PLAN OF CARE
"48 hour nursing assessment:  Pt evaluation continues. Assessed mood, anxiety, thoughts, and behavior. Is progressing towards goals. States feels anxious about leaving. \"my brother makes me suicidal\". Encourage participation in groups and developing healthy coping skills. Pt denies auditory or visual  Hallucinations or SI. Refer to daily team meeting notes for individualized plan of care. Will look at discharge at 1800 after pt calls his brother. Will continue to assess.    "

## 2019-12-11 NOTE — DISCHARGE SUMMARY
"Psychiatric Discharge Summary    Augustin Estrella MRN# [id number]   Age: 24 year old YOB: 1995     Date of Admission:  12/6/2019  Date of Discharge:  12/11/2019  Admitting Physician:  Jaspreet Tidwell MD  Discharge Physician:  Debra A. Naegele, APRN CNS (Contact: [phone])         Event Leading to Hospitalization:   Augustin Estrella is a 24-year-old male admitted to [facility] on 12/6/2019, brought in via EMS after calling a suicide hotline and then 911.  He was admitted as a voluntary patient through the ER due to depressive symptoms and suicidal ideation with a plan to hang himself.  Two weeks prior to admission he hung a noose in the garage and was standing on a stool \"but I couldn't make it happen ... I was too scared.\"  He has multiple cuts on his arms due to self-injury.  He cuts himself about monthly, \"whenever something bad happens.\"  His father left the family in [place] and his mother was in danger of losing the home (and later did lose it), so he moved to [place] in August 2019 and has been living with his brother.  He has been unable to keep a job for longer than 2 weeks.  His brother does not understand his mental illness and has made some hurtful comments.  \"He blames everything on me being lazy.\"  He has not taken any medications for 6 months and does not have providers in [place].  He does not have insurance.  He reports consuming alcohol once weekly.  UTOX was negative.          See Admission note by Sheridan Hall APRN CNP on 12/7/2019   for additional details.          DIagnoses:   Major depressive disorder, severe, recurrent, without psychosis  PTSD  Social anxiety disorder         Labs:     Results for orders placed or performed during the hospital encounter of 12/06/19   Drug abuse screen 6 urine (tox)     Status: None   Result Value Ref Range    Amphetamine Qual Urine Negative NEG^Negative    Barbiturates Qual Urine Negative NEG^Negative    " Benzodiazepine Qual Urine Negative NEG^Negative    Cannabinoids Qual Urine Negative NEG^Negative    Cocaine Qual Urine Negative NEG^Negative    Ethanol Qual Urine Negative NEG^Negative    Opiates Qualitative Urine Negative NEG^Negative   Vitamin D Deficiency     Status: Abnormal   Result Value Ref Range    Vitamin D Deficiency screening 18 (L) 20 - 75 ug/L   CBC with platelets differential     Status: None   Result Value Ref Range    WBC 8.2 4.0 - 11.0 10e9/L    RBC Count 4.90 4.4 - 5.9 10e12/L    Hemoglobin 14.5 13.3 - 17.7 g/dL    Hematocrit 45.5 40.0 - 53.0 %    MCV 93 78 - 100 fl    MCH 29.6 26.5 - 33.0 pg    MCHC 31.9 31.5 - 36.5 g/dL    RDW 12.7 10.0 - 15.0 %    Platelet Count 266 150 - 450 10e9/L    Diff Method Automated Method     % Neutrophils 45.8 %    % Lymphocytes 42.5 %    % Monocytes 8.4 %    % Eosinophils 2.4 %    % Basophils 0.5 %    % Immature Granulocytes 0.4 %    Nucleated RBCs 0 0 /100    Absolute Neutrophil 3.7 1.6 - 8.3 10e9/L    Absolute Lymphocytes 3.5 0.8 - 5.3 10e9/L    Absolute Monocytes 0.7 0.0 - 1.3 10e9/L    Absolute Eosinophils 0.2 0.0 - 0.7 10e9/L    Absolute Basophils 0.0 0.0 - 0.2 10e9/L    Abs Immature Granulocytes 0.0 0 - 0.4 10e9/L    Absolute Nucleated RBC 0.0    Comprehensive metabolic panel     Status: None   Result Value Ref Range    Sodium 138 133 - 144 mmol/L    Potassium 4.2 3.4 - 5.3 mmol/L    Chloride 105 94 - 109 mmol/L    Carbon Dioxide 29 20 - 32 mmol/L    Anion Gap 4 3 - 14 mmol/L    Glucose 88 70 - 99 mg/dL    Urea Nitrogen 9 7 - 30 mg/dL    Creatinine 0.84 0.66 - 1.25 mg/dL    GFR Estimate >90 >60 mL/min/[1.73_m2]    GFR Estimate If Black >90 >60 mL/min/[1.73_m2]    Calcium 8.8 8.5 - 10.1 mg/dL    Bilirubin Total 0.7 0.2 - 1.3 mg/dL    Albumin 3.9 3.4 - 5.0 g/dL    Protein Total 7.4 6.8 - 8.8 g/dL    Alkaline Phosphatase 40 40 - 150 U/L    ALT 24 0 - 70 U/L    AST 10 0 - 45 U/L   Lipid panel     Status: None   Result Value Ref Range    Cholesterol 114 <200 mg/dL     Triglycerides 47 <150 mg/dL    HDL Cholesterol 62 >39 mg/dL    LDL Cholesterol Calculated 43 <100 mg/dL    Non HDL Cholesterol 52 <130 mg/dL   TSH with free T4 reflex and/or T3 as indicated     Status: None   Result Value Ref Range    TSH 3.03 0.40 - 4.00 mU/L            Consults:   No consultations were requested during this admission         Hospital Course:   Augustin Estrella was admitted to [facility] with attending Jaspreet Tidwell MD as a voluntary patient. The patient was placed under status 15 (15 minute checks) to ensure patient safety.     Patient was started on sertraline 25 mg and titrated to 50 mg to address his depressive and anxiety symptoms. He used hydroxyzine for his anxiety. Both medications were effective for patient and he denied any side effects. Discussed risks, benefits and side effects of medications with patient.       Reviewed admission labs: unremarkable, vitamin D level low (18). Recommend supplementation. UTOX was negative.       Augustin Estrella did participate in groups and was visible in the milieu. The patient's symptoms of suicidal ideation improved. Patient's mood improved and he denies suicidal ideation. Patient has protective factors of supportive mother. Patient will return to home to live with his mother in [place].     Patient no longer meets criteria for hospital level of car.e     He is at low risk of relapse.     Augustin Estrella was released to home. At the time of discharge Augustin Estrella was determined to not be a danger to himself or others.          Discharge Medications:     Current Discharge Medication List      START taking these medications    Details   hydrOXYzine (ATARAX) 25 MG tablet Take 1-2 tablets (25-50 mg) by mouth every 4 hours as needed for anxiety  Qty: 120 tablet, Refills: 1    Associated Diagnoses: Anxiety      sertraline (ZOLOFT) 50 MG tablet Take 1 tablet (50 mg) by mouth daily  Qty: 30 tablet, Refills: 1    Associated Diagnoses: Severe recurrent major  depression without psychotic features (H)                  Psychiatric Examination:   Appearance:  awake, alert and adequately groomed  Attitude:  cooperative  Eye Contact:  good  Mood:  better  Affect:  appropriate and in normal range  Speech:  clear, coherent  Psychomotor Behavior:  no evidence of tardive dyskinesia, dystonia, or tics  Thought Process:  logical, linear and goal oriented  Associations:  no loose associations  Thought Content:  no evidence of suicidal ideation or homicidal ideation  Insight:  good  Judgment:  intact  Oriented to:  time, person, and place  Attention Span and Concentration:  intact  Recent and Remote Memory:  intact  Language: Able to name objects, Able to repeat phrases and Able to read and write  Fund of Knowledge: appropriate  Muscle Strength and Tone: normal  Gait and Station: Normal         Discharge Plan:   Behavioral Discharge Planning and Instructions        Summary:  You were admitted on 12/6/2019  due to Suicidal Ideations.  You were treated by Debra Naegele, APRN, CNS and discharged on 12/11/19 from [facility] to Home        Principal Diagnosis:   Major depressive disorder, severe, recurrent, without psychosis  PTSD  Social anxiety disorder        Health Care Follow-up Appointments:   [facility] Triage    Triage brings together Care coordinators, peer recovery specialists, and health professionals at [address] [place] [address] to address clients' physical health, mental health, addiction issues - and also the wrap-around services that they need to stay healthy, including housing, health insurance, and other resources.  The goal of this program is to reach people that are not already receiving services or that are not already connected to case management who have an urgent concern related to their mental health or substance use disorder.  People can access services on a walk basis in when Triage is open.  Hours:10:00 am - 5:00 pm  Phone Number: [phone]  Location: [address] [place] [address]  room [address]      [facility]  [address], [place]   Phone: [phone]  Hours:  Monday, Wednesday, Friday - 1pm-3pm  Monday-Thursday - 6:30pm-8:30pm     When you arrived at the hospital you did not have health insurance.  You have been given resources for [facility] Triage to help you get in touch with services, including applying for medical assistance if you were not able to meet with someone while you were hospitalized.    Attend all scheduled appointments with your outpatient providers. Call at least 24 hours in advance if you need to reschedule an appointment to ensure continued access to your outpatient providers.   Major Treatments, Procedures and Findings:  You were provided with: a psychiatric assessment, assessed for medical stability, medication evaluation and/or management, group therapy, milieu management and meeting with a patient      Symptoms to Report: feeling more aggressive, increased confusion, losing more sleep, mood getting worse or thoughts of suicide     Early warning signs can include: increased depression or anxiety sleep disturbances increased thoughts or behaviors of suicide or self-harm  increased unusual thinking, such as paranoia or hearing voices     Safety and Wellness:  The patient should take medications as prescribed.  Patient's caregivers are highly encouraged to supervise administering of medications and follow treatment recommendations.     Patient's caregivers should ensure patient does not have access to:    Firearms  Medicines (both prescribed and over-the-counter)  Knives and other sharp objects  Ropes and like materials  Alcohol  Car keys  If there is a concern for safety, call 911.     Resources:   Crisis Intervention: 872.126.3957 or 781-682-1546 (TTY: 634.320.9641).  Call anytime for help.  [employer] (www.[place].charissa.org): 754.221.7765 or 223-607-6092.  [facility] Crisis  (COPE) Response - Adult [phone]        The treatment team has appreciated the opportunity to work with you.     Augustin,  please take care and make your recovery a daily recovery.   If you have any questions or concerns our unit number is [phone]             Attestation:  The patient has been seen and evaluated by me,  Debra A. Naegele, APRN CNS on 12/11/2019  Discharge summary time > 30 minutes

## 2019-12-11 NOTE — PROGRESS NOTES
Patient discharging 12/11/2019 1845 accompanied by Brother and destination is home.    Discharge paperwork and medications reviewed with patient who verbalizes understanding. Pt reports anxiety with living with Brother. He reports brother not supportive. Discussed plan to have a regular scheduled routine that begins with shower, looking for a job, self care and helping out in the home as needed. Pt smiles and reports that he agrees that knowing the environment and where he is going is somewhat reassuring.  Denies current suicidal ideation and no self injurious thoughts.    Copies provided: AVS -done      Med Rec X MedsX  Security items returned.  Locker emptied and pt takes all personal items.     DISCHARGE FLOW SHEET: Done    CARE PLAN COMPLETE: Done    EDUCATION COMPLETE: Done    Illness Management Recovery model: Personal Plan of Care    Patient completed Personal Plan of Care, identifying reasons for hospitalization and goals for discharge. Form reviewed in team meeting  by patient, physician, writer and RN. Form given to [facility] to be scanned into EPIC.    Survey provided.

## 2019-12-11 NOTE — PLAN OF CARE
48 Hour Nursing Assessment    Patient evaluation continues. Assessed mood, anxiety, thoughts and behavior. Patient denies auditory or visual hallucinations. Is progressing toward goals. Encourage participation in groups and developing healthy coping skills. Will continue to assess.     Pt had an okay shift. He has been visible and participating in the milieu. He is cooperative on the unit. Pt reports a lot of anxiety around discharging tomorrow and does not feel he is safe to leave. Pt states that he does not have a support system here and states that his brother, who he currently lives with, is not supportive of his mental health concerns. Pt endorses SI outside the hospital to hang himself in the garage, but contracts for safety on the unit. Pt denies SIB and hallucinations. Pt has been taking his medications and denies side effects. Pt reports sleep and appetite have been better. Pt denies any other concerns at this time.